# Patient Record
Sex: FEMALE | Race: ASIAN | NOT HISPANIC OR LATINO | Employment: OTHER | URBAN - METROPOLITAN AREA
[De-identification: names, ages, dates, MRNs, and addresses within clinical notes are randomized per-mention and may not be internally consistent; named-entity substitution may affect disease eponyms.]

---

## 2024-09-14 ENCOUNTER — HOSPITAL ENCOUNTER (EMERGENCY)
Facility: HOSPITAL | Age: 59
Discharge: HOME | End: 2024-09-14
Attending: EMERGENCY MEDICINE
Payer: MEDICAID

## 2024-09-14 ENCOUNTER — APPOINTMENT (OUTPATIENT)
Dept: GENERAL RADIOLOGY | Facility: HOSPITAL | Age: 59
End: 2024-09-14
Payer: MEDICAID

## 2024-09-14 VITALS
DIASTOLIC BLOOD PRESSURE: 91 MMHG | SYSTOLIC BLOOD PRESSURE: 140 MMHG | TEMPERATURE: 98.5 F | HEIGHT: 66 IN | BODY MASS INDEX: 28.45 KG/M2 | HEART RATE: 76 BPM | RESPIRATION RATE: 22 BRPM | OXYGEN SATURATION: 95 % | WEIGHT: 177.03 LBS

## 2024-09-14 DIAGNOSIS — F32.A DEPRESSION, UNSPECIFIED DEPRESSION TYPE: ICD-10-CM

## 2024-09-14 DIAGNOSIS — F41.0 ANXIETY ATTACK: Primary | ICD-10-CM

## 2024-09-14 DIAGNOSIS — I10 PRIMARY HYPERTENSION: ICD-10-CM

## 2024-09-14 LAB
QT INTERVAL: 367 MS
QTC INTERVAL: 455 MS

## 2024-09-14 PROCEDURE — 99284 EMERGENCY DEPT VISIT MOD MDM: CPT

## 2024-09-14 PROCEDURE — 71045 X-RAY EXAM CHEST 1 VIEW: CPT

## 2024-09-14 PROCEDURE — 93005 ELECTROCARDIOGRAM TRACING: CPT

## 2024-09-14 PROCEDURE — 93005 ELECTROCARDIOGRAM TRACING: CPT | Performed by: EMERGENCY MEDICINE

## 2024-09-14 RX ORDER — ASPIRIN 81 MG/1
324 TABLET, CHEWABLE ORAL ONCE
Status: DISCONTINUED | OUTPATIENT
Start: 2024-09-14 | End: 2024-09-14

## 2024-09-14 RX ORDER — LISINOPRIL 10 MG/1
10 TABLET ORAL ONCE
Status: COMPLETED | OUTPATIENT
Start: 2024-09-14 | End: 2024-09-14

## 2024-09-14 RX ORDER — SODIUM CHLORIDE 0.9 % (FLUSH) 0.9 %
10 SYRINGE (ML) INJECTION AS NEEDED
Status: DISCONTINUED | OUTPATIENT
Start: 2024-09-14 | End: 2024-09-14 | Stop reason: HOSPADM

## 2024-09-14 RX ORDER — SERTRALINE HYDROCHLORIDE 100 MG/1
100 TABLET, FILM COATED ORAL ONCE
Status: COMPLETED | OUTPATIENT
Start: 2024-09-14 | End: 2024-09-14

## 2024-09-14 RX ADMIN — SERTRALINE HYDROCHLORIDE 100 MG: 100 TABLET, FILM COATED ORAL at 21:07

## 2024-09-14 RX ADMIN — LISINOPRIL 10 MG: 10 TABLET ORAL at 21:07

## 2024-09-15 NOTE — DISCHARGE INSTRUCTIONS
It is imperative that [facility] gives you your daily blood pressure and antidepressant medications or some version thereof.  Continue with your alcohol rehab treatment.  Return to the ER for any other concerns issues that may arise.  Establish primary care and arrange for outpatient follow-up.   Abdomen soft, non-tender, no guarding.

## 2024-09-15 NOTE — ED PROVIDER NOTES
Time: 8:41 PM EDT  Date of encounter:  9/14/2024  Independent Historian/Clinical History and Information was obtained by:   Patient  Chief Complaint: Elevated blood pressure and anxiety    History is limited by: N/A    History of Present Illness:  Patient is a 59 y.o. year old female who presents to the emergency department for evaluation of with a blood pressure and anxiety.  Patient is currently at [facility] for alcohol rehab.  Patient is she has been there for about 1 week.  Patient reports that she has not been receiving her blood pressure medication or her antidepression medication.  Patient states that it has been over a week since she has had her medications.  Patient reports that today she was in a meeting and she began thinking of her family which made her emotionally upset.  She states she began crying.  Patient was taken to the nurse and they checked her blood pressure and reportedly that was greater than 200 systolically.  Thus they brought her to the ER for evaluation.  Patient is concerned that she has been off of her blood pressure and antidepressant medications for over a week.  In triage it was reported that she had some midsternal chest pain however the patient denies any chest pain or discomfort.  Patient states that she was having a panic attack.    HPI    Patient Care Team  Primary Care Provider: Provider, No Known    Past Medical History:     No Known Allergies  History reviewed. No pertinent past medical history.  History reviewed. No pertinent surgical history.  History reviewed. No pertinent family history.    Home Medications:  Prior to Admission medications    Not on File        Social History:   Social History     Tobacco Use    Smoking status: Every Day     Current packs/day: 0.50     Types: Cigarettes   Substance Use Topics    Alcohol use: Yes         Review of Systems:  Review of Systems   Constitutional:  Negative for chills and fever.   HENT:  Negative for congestion, ear pain  "and sore throat.    Eyes:  Negative for pain.   Respiratory:  Negative for cough, chest tightness and shortness of breath.    Cardiovascular:  Negative for chest pain.   Gastrointestinal:  Negative for abdominal pain, diarrhea, nausea and vomiting.   Genitourinary:  Negative for flank pain and hematuria.   Musculoskeletal:  Negative for joint swelling.   Skin:  Negative for pallor.   Neurological:  Negative for seizures and headaches.   Psychiatric/Behavioral:  The patient is nervous/anxious.    All other systems reviewed and are negative.       Physical Exam:  /91   Pulse 76   Temp 98.5 °F (36.9 °C) (Oral)   Resp 22   Ht 167.6 cm (66\")   Wt 80.3 kg (177 lb 0.5 oz)   SpO2 95%   BMI 28.57 kg/m²     Physical Exam    Vital signs were reviewed under triage note.  General appearance - Patient appears well-developed and well-nourished.  Patient is in no acute distress.  Head - Normocephalic, atraumatic.  Pupils - Equal, round, reactive to light.  Extraocular muscles are intact.  Conjunctiva is clear.  Nasal - Normal inspection.  No evidence of trauma or epistaxis.  Tympanic membranes - Gray, intact without erythema or retractions.  Oral mucosa - Pink and moist without lesions or erythema.  Uvula is midline.  Chest wall - Atraumatic.  Chest wall is nontender.  There are no vesicular rashes noted.  Neck - Supple.  Trachea was midline.  There is no palpable lymphadenopathy or thyromegaly.  There are no meningeal signs  Lungs - Clear to auscultation and percussion bilaterally.  Heart - Regular rate and rhythm without any murmurs, clicks, or gallops.  Abdomen - Soft.  Bowel sounds are present.  There is no palpable tenderness.  There is no rebound, guarding, or rigidity.  There are no palpable masses.  There are no pulsatile masses.  Back - Spine is straight and midline.  There is no CVA tenderness.  Extremities - Intact x4 with full range of motion.  There is no palpable edema.  Pulses are intact x4 and " equal.  Neurologic - Patient is awake, alert, and oriented x3.  Cranial nerves II through XII are grossly intact.  Motor and sensory functions grossly intact.  Cerebellar function was normal.  Integument - There are no rashes.  There are no petechia or purpura lesions noted.  There are no vesicular lesions noted.           Procedures:  Procedures      Medical Decision Making:      Comorbidities that affect care:    Hypertension, depression, hyperlipidemia    External Notes reviewed:    Previous Clinic Note: Office visit with Dr. Duffy in 7-2-24 was reviewed by me.      The following orders were placed and all results were independently analyzed by me:  Orders Placed This Encounter   Procedures    XR Chest 1 View    Undress & Gown    Continuous Pulse Oximetry       Medications Given in the Emergency Department:  Medications   lisinopril (PRINIVIL,ZESTRIL) tablet 10 mg (10 mg Oral Given 9/14/24 2107)   sertraline (ZOLOFT) tablet 100 mg (100 mg Oral Given 9/14/24 2107)        ED Course:    ED Course as of 09/16/24 2254   Sat Sep 14, 2024   2041 EKG performed at 1903 was interpreted by me to show a normal sinus rhythm with a ventricular rate of 92 bpm.  The W[place] interval was 166 ms.  P waves are normal.  QRS interval is normal.  Axis was at 42 degrees.  There is no acute ischemic ST or T wave change identified.  QT corrected was 455 ms. [TB]      ED Course User Index  [TB] Kleber Diggs DO       The patient was seen and evaluated the ED by me.  The above history and physical examination was performed as documented.  Diagnostic data was obtained.  Results reviewed.  Findings were discussed with the patient.  The patient refused all blood work.  Patient's EKG was unremarkable.  Patient's blood pressure was not concerning in the ED.  I did give the patient a dose of her daily blood pressure medication.  I also gave her a dose of her Zoloft at her request.  I will put in my discharge instructions that it is imperative that she  gets her medication and that withdrawal from them is not in her health benefit.  At this time, the patient is stable for discharge back to [facility].    Labs:    Lab Results (last 24 hours)       ** No results found for the last 24 hours. **             Imaging:    No Radiology Exams Resulted Within Past 24 Hours      Differential Diagnosis and Discussion:    Chest Pain:  Based on the patient's signs and symptoms, I considered aortic dissection, myocardial infaction, pulmonary embolism, cardiac tamponade, pericarditis, pneumothorax, musculoskeletal chest pain and other differential diagnosis as an etiology of the patient's chest pain.   Metabolic: Differential diagnosis includes but is not limited to hypertension, hyperglycemia, hyperkalemia, hypocalcemia, metabolic acidosis, hypokalemia, hypoglycemia, malnutrition, hypothyroidism, hyperthyroidism, and adrenal insufficiency.     All X-rays impressions were independently interpreted by me.  EKG was interpreted by me.    MDM     Amount and/or Complexity of Data Reviewed  Tests in the radiology section of CPT®: reviewed  Tests in the medicine section of CPT®: reviewed             Patient Care Considerations:    LABS: I considered ordering labs, however the patient refused all labs.      Consultants/Shared Management Plan:    None    Social Determinants of Health:    Patient is independent, reliable, and has access to care.       Disposition and Care Coordination:    Discharged: I considered escalation of care by admitting this patient to the hospital, however the patient refused to allow appropriate ED workup and request to be discharged home.        Final diagnoses:   Anxiety attack   Primary hypertension   Depression, unspecified depression type        ED Disposition       ED Disposition   Discharge    Condition   Stable    Comment   --               This medical record created using voice recognition software.             Kleber Diggs DO  09/16/24 2508

## 2024-09-20 ENCOUNTER — HOSPITAL ENCOUNTER (OUTPATIENT)
Dept: GENERAL RADIOLOGY | Facility: HOSPITAL | Age: 59
Discharge: HOME | End: 2024-09-20
Payer: MEDICAID

## 2024-09-20 ENCOUNTER — TRANSCRIBE ORDERS (OUTPATIENT)
Dept: ADMINISTRATIVE | Facility: HOSPITAL | Age: 59
End: 2024-09-20
Payer: MEDICAID

## 2024-09-20 DIAGNOSIS — Z11.1 SCREENING EXAMINATION FOR PULMONARY TUBERCULOSIS: ICD-10-CM

## 2024-09-20 DIAGNOSIS — Z11.1 SCREENING EXAMINATION FOR PULMONARY TUBERCULOSIS: Primary | ICD-10-CM

## 2024-09-20 PROCEDURE — 71045 X-RAY EXAM CHEST 1 VIEW: CPT

## 2024-09-22 LAB
QT INTERVAL: 367 MS
QTC INTERVAL: 455 MS